# Patient Record
Sex: FEMALE | Race: WHITE | NOT HISPANIC OR LATINO | Employment: FULL TIME | URBAN - METROPOLITAN AREA
[De-identification: names, ages, dates, MRNs, and addresses within clinical notes are randomized per-mention and may not be internally consistent; named-entity substitution may affect disease eponyms.]

---

## 2020-01-02 ENCOUNTER — TRANSCRIBE ORDERS (OUTPATIENT)
Dept: ADMINISTRATIVE | Facility: HOSPITAL | Age: 40
End: 2020-01-02

## 2020-01-02 DIAGNOSIS — N63.23 UNSPECIFIED LUMP IN THE LEFT BREAST, LOWER OUTER QUADRANT: Primary | ICD-10-CM

## 2020-01-14 ENCOUNTER — APPOINTMENT (OUTPATIENT)
Dept: WOMENS IMAGING | Facility: HOSPITAL | Age: 40
End: 2020-01-14

## 2020-01-30 ENCOUNTER — HOSPITAL ENCOUNTER (OUTPATIENT)
Dept: ULTRASOUND IMAGING | Facility: HOSPITAL | Age: 40
Discharge: HOME | End: 2020-01-30

## 2020-01-30 ENCOUNTER — HOSPITAL ENCOUNTER (OUTPATIENT)
Dept: MAMMOGRAPHY | Facility: HOSPITAL | Age: 40
Discharge: HOME | End: 2020-01-30
Admitting: NURSE PRACTITIONER

## 2020-01-30 DIAGNOSIS — N63.23 UNSPECIFIED LUMP IN THE LEFT BREAST, LOWER OUTER QUADRANT: ICD-10-CM

## 2020-01-30 PROCEDURE — G0279 TOMOSYNTHESIS, MAMMO: HCPCS

## 2020-01-30 PROCEDURE — 77066 DX MAMMO INCL CAD BI: CPT | Performed by: RADIOLOGY

## 2020-01-30 PROCEDURE — 76642 ULTRASOUND BREAST LIMITED: CPT | Performed by: RADIOLOGY

## 2020-01-30 PROCEDURE — 77066 DX MAMMO INCL CAD BI: CPT

## 2020-01-30 PROCEDURE — 76642 ULTRASOUND BREAST LIMITED: CPT

## 2020-01-30 PROCEDURE — 77062 BREAST TOMOSYNTHESIS BI: CPT | Performed by: RADIOLOGY

## 2020-06-11 ENCOUNTER — APPOINTMENT (OUTPATIENT)
Dept: WOMENS IMAGING | Facility: HOSPITAL | Age: 40
End: 2020-06-11

## 2020-06-11 ENCOUNTER — OFFICE VISIT (OUTPATIENT)
Dept: OBSTETRICS AND GYNECOLOGY | Facility: HOSPITAL | Age: 40
End: 2020-06-11

## 2020-06-11 VITALS
SYSTOLIC BLOOD PRESSURE: 111 MMHG | RESPIRATION RATE: 20 BRPM | BODY MASS INDEX: 27.15 KG/M2 | WEIGHT: 173 LBS | HEIGHT: 67 IN | DIASTOLIC BLOOD PRESSURE: 70 MMHG | HEART RATE: 61 BPM

## 2020-06-11 DIAGNOSIS — Z86.69 HISTORY OF MIGRAINE: ICD-10-CM

## 2020-06-11 DIAGNOSIS — E07.9 DISEASE OF THYROID GLAND: Primary | ICD-10-CM

## 2020-06-11 DIAGNOSIS — M32.8 OTHER FORMS OF SYSTEMIC LUPUS ERYTHEMATOSUS, UNSPECIFIED ORGAN INVOLVEMENT STATUS (HCC): ICD-10-CM

## 2020-06-11 DIAGNOSIS — F41.9 ANXIETY: ICD-10-CM

## 2020-06-11 PROCEDURE — 99242 OFF/OP CONSLTJ NEW/EST SF 20: CPT | Performed by: OBSTETRICS & GYNECOLOGY

## 2020-06-11 RX ORDER — OMEPRAZOLE 20 MG/1
1 CAPSULE, DELAYED RELEASE ORAL NIGHTLY
COMMUNITY
Start: 2020-06-02

## 2020-06-11 RX ORDER — AZELAIC ACID 0.15 G/G
1 GEL TOPICAL TAKE AS DIRECTED
COMMUNITY
Start: 2020-05-12 | End: 2020-11-12

## 2020-06-11 RX ORDER — PROPRANOLOL HYDROCHLORIDE 120 MG/1
1 CAPSULE, EXTENDED RELEASE ORAL DAILY
COMMUNITY

## 2020-06-11 RX ORDER — BUSPIRONE HYDROCHLORIDE 10 MG/1
10 TABLET ORAL DAILY
COMMUNITY
Start: 2020-06-01

## 2020-06-11 RX ORDER — HYDROXYCHLOROQUINE SULFATE 200 MG/1
1 TABLET, FILM COATED ORAL 2 TIMES DAILY
COMMUNITY
Start: 2020-04-27

## 2020-06-11 RX ORDER — PREDNISONE 1 MG/1
1 TABLET ORAL DAILY
COMMUNITY
Start: 2020-06-09

## 2020-06-11 RX ORDER — CYCLOBENZAPRINE HCL 5 MG
1 TABLET ORAL AS NEEDED
COMMUNITY
Start: 2020-05-19 | End: 2020-11-12

## 2020-06-11 RX ORDER — CYCLOSPORINE 0.5 MG/ML
1 EMULSION OPHTHALMIC 2 TIMES DAILY
COMMUNITY
Start: 2020-03-27

## 2020-06-11 RX ORDER — ACETAMINOPHEN AND CODEINE PHOSPHATE 120; 12 MG/5ML; MG/5ML
1 SOLUTION ORAL DAILY
COMMUNITY

## 2020-06-11 RX ORDER — HYDROCODONE BITARTRATE AND ACETAMINOPHEN 10; 325 MG/1; MG/1
1 TABLET ORAL AS NEEDED
COMMUNITY
Start: 2020-05-29

## 2020-06-11 RX ORDER — LEVOMEFOLATE CALCIUM 15 MG
1 TABLET ORAL DAILY
COMMUNITY

## 2020-06-11 RX ORDER — CEVIMELINE HYDROCHLORIDE 30 MG/1
1 CAPSULE ORAL DAILY PRN
COMMUNITY
Start: 2020-06-09 | End: 2020-11-12

## 2020-06-11 RX ORDER — DOXYCYCLINE HYCLATE 100 MG/1
1 CAPSULE ORAL TAKE AS DIRECTED
COMMUNITY
Start: 2020-05-12 | End: 2020-11-12

## 2020-06-11 RX ORDER — SUMATRIPTAN 100 MG/1
1 TABLET, FILM COATED ORAL AS NEEDED
COMMUNITY
Start: 2020-05-12

## 2020-06-11 RX ORDER — ALPRAZOLAM 1 MG/1
1 TABLET ORAL AS NEEDED
COMMUNITY
Start: 2020-05-05

## 2020-06-11 RX ORDER — LEVOTHYROXINE SODIUM 50 MCG
1 TABLET ORAL DAILY
COMMUNITY
Start: 2020-06-07

## 2020-06-11 RX ORDER — DEXTROAMPHETAMINE SACCHARATE, AMPHETAMINE ASPARTATE, DEXTROAMPHETAMINE SULFATE AND AMPHETAMINE SULFATE 5; 5; 5; 5 MG/1; MG/1; MG/1; MG/1
1 TABLET ORAL DAILY PRN
COMMUNITY
Start: 2020-05-06 | End: 2020-11-12

## 2020-06-11 RX ORDER — HYDROXYZINE 50 MG/1
1 TABLET, FILM COATED ORAL 2 TIMES DAILY
COMMUNITY
Start: 2020-06-09 | End: 2022-06-30 | Stop reason: SDUPTHER

## 2020-06-11 RX ORDER — TIZANIDINE HYDROCHLORIDE 2 MG/1
1 CAPSULE, GELATIN COATED ORAL NIGHTLY
COMMUNITY
Start: 2020-06-02

## 2020-06-11 RX ORDER — ESCITALOPRAM OXALATE 20 MG/1
30 TABLET ORAL DAILY
COMMUNITY
Start: 2020-06-01

## 2020-06-12 PROBLEM — E07.9 DISEASE OF THYROID GLAND: Status: ACTIVE | Noted: 2020-01-01

## 2020-06-12 PROBLEM — F41.9 ANXIETY: Status: ACTIVE | Noted: 2020-06-12

## 2020-06-12 PROBLEM — Z86.69 HISTORY OF MIGRAINE: Status: ACTIVE | Noted: 2020-06-12

## 2020-06-12 NOTE — PROGRESS NOTES
2020        Mel Franks M.D.  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  As you will recall, she is a 40-year-old white female G0 with medical issues including lupus, hypothyroidism and Sjogren syndrome.  She is desirous of proceeding with attempts at conception but wishes to maximize her  outcome given her medical history and advanced maternal age.      On review of her social history, the patient is engaged to be .  Her fiancé has no children from any previous relationships.  She denies tobacco use and notes only occasional ethanol use.  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      Her family history is negative for diabetes, positive for hypertension in her father and negative for preeclampsia.  On further review of her family history, the patient denies any family members with Down syndrome, mental retardation or neural tube defects.  She further denies any family history of genetic disorders including cystic fibrosis, Duchenne muscular dystrophy or Johnson chorea.      On review of her past medical history she has hypothyroidism, Sjogren syndrome, systemic lupus erythematosus diagnosed in , anxiety and migraines.      On review of her surgical history, she had L5-S1 microdiscectomy in , wisdom teeth removal in  and a LEEP procedure.      Medication allergies include penicillin and sulfur.      Medications include Plaquenil/hydroxychloroquine 200 mg one tab p.o. b.i.d., prednisone 1 mg q day, propranolol 120 mg p.o. q day, Synthroid 50 mcg q day, hydroxyzine 50 mg 2 tabs b.i.d., Lexapro 30 mg q day, buspirone 10 mg q day, L-methylfolate 15 mg tablet q day, alprazolam 0.5 mg t.i.d., Imitrex 509 mg p.r.n., One-A-Day for Women vitamin, vitamin D, diclofenac 50 mg b.i.d.,  tizanidine 2 mg capsule, doxycycline 100 mg capsule and cevimeline 30 mg capsule    On exam today, /70 mmHg, heart rate 61 bpm, respirations 20 and weight 173 (BMI = 27.1).      The following is a summary of my counseling session with Dr. Luna concerning her desire for improved  outcome:    1. Advanced Maternal Age.  The patient is currently 40 years old.  I discussed the risk of fetal aneuploidy at age 40 years at the time of delivery including a risk for Down syndrome of 1 in 82 and a risk for any chromosomal abnormality as 1 in 53.  Options in genetic screening and genetic testing were discussed with the patient.  I noted genetic testing could include 1st trimester chorionic villous sampling.  This would carry a 1 in 100 procedure-related fetal loss rate.  I also noted the option of midtrimester transabdominal amniocentesis for the determination of fetal chromosomal complement as well as amniotic fluid alpha-fetoprotein.  I quoted a procedure-related fetal loss rate of 1 in 500 with a midtrimester amniocentesis.  I noted a recent option of cell-free fetal DNA for genetic screening.  This screen appears to have a high sensitivity for detection of Down syndrome but a lower detection for other fetal aneuploidies.  Options in genetic screening would include 1st trimester combined ultrasound and biochemical assessment.  I noted this would detect approximately 85% of fetuses with Down syndrome with a 5% false positive rate.  I would also note the option of midtrimester ultrasound and/or biochemical screening which could provide a detection rate for Down syndrome as high as 80% and for open neural tube defects above 90%.      2. Pregnancy in the Woman Age 40 and Older.  The rate of first births for women aged 40 to 44 remained essentially stable during the 1970s and early 1980s but increased more than fourfold from 1985 through 2012--from 0.5 to 2.3 per 1,000 women.  Women with advanced age at conception  are more likely to have a multifetal gestation because of the need for assisted reproduction and are more likely to require  delivery as a result of abnormal placentation, fetal malpresentation, an abnormal pattern of labor, or increased use of oxytocin in labor.  In addition, they are more likely to experience rupture of the sphincter, postpartum hemorrhage, and thromboembolism.      Older pregnant women have an incidence of gestational hypertension and preeclampsia 2 to 4 times as high as that of patients younger than 30 years.  The underlying risk for preeclampsia is further increased if coexisting medical disorders such as diabetes or chronic hypertension are present.  Moreover, the risk for preeclampsia increased to 10% to 20% in twin gestations and 25% to 60% in triplet gestations.  Xenia Hanna and colleagues reported that, if oocyte donation is used with IVF in women older than 43, the risk for preeclampsia triples.      The rates of both diabetes mellitus and gestational diabetes increase with advanced maternal age.  Data from the [employer] consortium included an adjusted odds ratio of 2.4 to gestational diabetes in women aged 40 or older, compared with a younger control group.  This increased risk may be a consequence of greater maternal habitus as well as declining insulin sensitivity.      Diabetes increases the risk of macrosomia,  birth, and gestational hypertension.  Among women with pregestational diabetes, the risks of congenital heart disease and fetal neural tube defects increase threefold.    Patients attempting pregnancy after age 40 have reduced fertility.  As such, the patient should consider ovulation predictor kits to increase her likelihood of success for conception.  If conception does not occur within six months of effort, I would suggest a basic fertility evaluation including hysterosalpingogram and semen analysis.      In a review of more than 5.4 million [place] pregnancies  without reported congenital anomalies, Jesus and colleagues found an association between advanced maternal age and stillbirth, with a higher risk of stillbirth at 37 to 41 weeks' gestation.  The effect of maternal age persisted despite adjusting for medical disease, parity and race/ethnicity.  Because the risk of fetal loss sharply increased at 40 weeks' gestation, in the study by Jesus and colleagues, women older than 40 should be considered for delivery by 40 weeks' gestation in the presence of good dating criteria.  (see Yovany and Diego, Optimal obstetrical care for women aged 40 and older, OBG Management, November 2014)    3. Folic Acid Supplementation.  I discussed the potential benefit of folic acid supplementation for the reduction of risk of fetal open neural tube defects.  I also noted more recent public health information would suggest folic acid supplementation is associated with a reduction in risk of congenital heart defects, abdominal wall defects and potentially cleft lip and palate.  I noted that this supplementation would need to be started at least three months preconceptually and continued through the 1st trimester of pregnancy.  The patient is currently utilizing L-methylfolate in conjunction with her Lexapro therapy and is encouraged to continue this supplementation for its potential fetal benefit.      4. Sjogren Syndrome.  Dryness of the exocrine glands, particularly the eyes and mouth, is referred to as sicca syndrome which is differentiated from Sjogren syndrome in that there is no evidence of an autoimmune disease.  When these symptoms are present as a component of an autoimmune disease, particularly another disease of connective tissue such as rheumatoid arthritis (most common), lupus, scleroderma or polymyositis, it is known as Sjogren syndrome.      Sjogren syndrome is characterized by lymphocytic and plasma cell infiltration and destruction of salivary and lacrimal glands with  subsequent diminished lacrimal and salivary gland secretions.  It can occur in a primary form where dry mouth and dry eyes develop as an isolated entity or a secondary form associated with other autoimmune connective tissue diseases.  The diagnosis of Sjogren syndrome is based on the presence of at least two of three objective diagnostic tests:  1) Positive serum levels of anti-Ro and/or anti-La or a positive rheumatoid factor and KATHY titer of at least 1:320, 2) salivary gland biopsy exhibiting focal sites of inflammation and, 3) keratoconjunctivitis sicca with ocular staining score of three or more.    Extraglandular involvement of Sjogren syndrome occurs in 50% of patients.  The systemic manifestations can include dyspareunia, pulmonary involvement including COPD, gastrointestinal conditions including hepatitis, renal manifestations including interstitial nephritis, neuropathy, arthralgias, hypothyroidism and rheumatoid arthritis.  Hematologic conditions such as lymphoma and leukemia have also been reported with nearly 5% of patients developing B-cell lymphoma.  Laboratory evaluation for patients should include evaluation of autoantibodies anti-SS-A and anti-SS-B as, if present, they are associated with congenital heart block.  Further, lab evaluation should include thyroid function tests, liver function tests, evaluation of aPLS antibodies and serum C3 and C4 levels.  Fetal surveillance should include ultrasound for growth and fetal cardiac evaluation for congenital heart block if anti-SS-A or anti-SS-B are present.      5. Preconceptual Genetic Carrier Screening.  The goal of genetic screening is to provide individuals with meaningful information that they can use to guide pregnancy planning based on their personal values.  Screening for any condition is optional and a patient may decline any or all carrier screening.  The determination of the appropriate screening approach should be based on the patient's  family history and personal values after counseling.  Per the March 2017 ACOG Committee Opinion, all patients who are considering pregnancy or are already pregnant, regardless of screening strategy and ethnicity, should be offered carrier screening for cystic fibrosis and spinal muscular atrophy, as well as a complete blood count and screening for thalassemias and hemoglobinopathies.  Fragile X permutation carrier screening is recommended for women with a family history of fragile X-related disorders or intellectual disability suggestive of fragile X syndrome (Committee Opinion #690). It is important to note, however, that carrier screening will not identify all individuals who are at risk of the screened conditions.      The patient and her fiance are both  and of  descent.  As such, each carries a 1 in 25 risk for being carriers of the autosomal recessive gene for cystic fibrosis.  I discussed the possibility of cystic fibrosis carrier status determination for Dr. 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  Should this return positive, I would offer similar testing to her fiance.  If both individuals of this couple were deemed positive as carriers of the autosomal recessive gene for cystic fibrosis, this would place their risk of having an offspring with cystic fibrosis disease as 1 in 4.   The patient and her fiancé will review this information on cystic fibrosis carrier status determination and proceed with screening, if desired.      Spinal muscular atrophy (SMA) is a genetic disorder that affects the control of muscle movement.  It is caused by a loss of specialized nerve cells, called motor neurons, in the spinal cord and brainstem.  The loss of motor neurons leads to weakness and wasting of muscles used for activities such as crawling, walking and controlling head movement.  In severe cases of spinal muscular atrophy, the muscles used for breathing and swallowing are affected.  SMA is the second most common fatal autosomal  recessive disorder after cystic fibrosis, with an estimated prevalence of 1 in 10,000 live births and a carrier frequency of 1/40 - 1/60.      Childhood SMA is subdivided into clinical groups on the basis of age of onset and clinical course.  Types I, II, III and IV spinal muscular atrophy are inherited in an autosomal recessive pattern, which means both copies of the SMN1 gene in each cell have mutations.  Because SMA is the result of a common single deletion event in 95% of the cases, the carrier test is very sensitive (~90% detection rate); however, the molecular testing does not identify all carriers and therefore false-negatives can occur.  Further, approximately 2% of affected individuals with SMA have a de jun mutation.      I discussed the possibility of SMA carrier status determination for Dr. Luna.  Should this return positive, I would offer similar testing to her fiance.  If both individuals of this couple were deemed positive as carriers of the autosomal recessive gene for SMA, this would place their risk of having an offspring with SMA disease as 1 in 4.   The patient and her fiancé will review this information on SMA carrier status determination and proceed with screening, if desired.      6. Systemic Lupus Erythematosus.  Systemic lupus erythematosus (SLE) is notoriously variable in its presentation, course and outcome.   Over the past several decades, pregnancy outcomes in women with SLE have remarkably improved.  Important factors for pregnancy outcome include:  whether disease is active at the beginning of the pregnancy, coexistence of other medical or obstetrical disorders, whether antiphospholipid antibodies are detected and absence of active renal involvement as manifest by proteinuria or renal dysfunction.  As a general rule during pregnancy, lupus improves in a third of women, remains unchanged in a third and worsens in the remaining third.      Management during pregnancy consists primarily  of monitoring maternal clinical and laboratory conditions as well as fetal well-being.  Pregnancy-induced thrombocytopenia and proteinuria resemble lupus disease activity and the identification of a lupus flare is confounded by the increase in facial and palmar erythema of normal pregnancy.  The sedimentation rate is misleading because of pregnancy-induced hyperfibrinogenemia.  Falling levels of complement components C3 and C4 are more likely to be associated with active lupus disease.      Pharmacologic treatments of lupus during pregnancy can include immunosuppressive agents such as azathioprine, antimalarials or glucocorticoid therapy.  Although hydroxychloroquine is noted to cross the placenta, it has not been associated with congenital malformations.  Further, in randomized studies of hydroxychloroquine versus placebo there has been a reported improvement in outcomes of those receiving hydroxychloroquine therapy. Immunosuppressive agents such as azathioprine are beneficial in controlling active disease and have a good safety record during pregnancy. Cyclophosphamide, however, is teratogenic and is not recommended for use during pregnancy.  For severe lupus flares, I would recommend bolus glucocorticoid steroids such as methylprednisolone.    The pediatrician should be informed of the patient's history such that evaluation for  lupus erythematosus can occur.  Specifically, these infants are at risk for complete or incomplete congenital heart block, subacute cutaneous lupus erythematosus lesions, hematologic manifestations including severe thrombocytopenia and rarely transient CNS abnormalities.      7. Hypothyroidism is Pregnancy.  Thyroid disease is the second most common endocrine disorder in pregnancy after diabetes.  It is estimated that 1 in 50 pregnancies has some mild subclinical hypothyroidism.  Hypothyroidism in pregnancy can be the result of prior thyroid surgery that interferes with thyroid  function, previous hyperthyroidism that was over treated with radioactive iodine or Hashimoto's thyroiditis.  Patients with hypothyroidism often will present with fatigue, lethargy, weight gain, cold intolerance and constipation.  Pregnant patients with hypothyroidism have a significant increase in their need for thyroid replacement in the 1st trimester.  An increased dose of 30% has been suggested which can be accomplished by increasing the daily dose or by having the patient take two extra doses of their medication per week.  Thyroid function tests can be followed every 4-8 weeks after the 1st trimester for further adjustment in therapy. Statistically, patients with hypothyroidism are at increased risk for the development of preeclampsia and assessment of blood pressure and proteinuria are warranted in the 3rd trimester.      8. Medication Review.    o Doxycycline (Monodox, Doxy-100, Oracea) is a highly effective and inexpensive broad-spectrum antibiotic with exceptional bioavailability; however, these benefits have been overshadowed by its classification alongside the tetracyclines - class D drugs, contraindicated in pregnancy. Tetracycline chelates calcium orthophosphate to form a complex that is irreversibly incorporated into teeth during the calcification stage of tooth development.  Despite the lack of data on permanent human dental staining or decreased fetal bone growth associated with doxycycline use during pregnancy, the lingering fear of irreversible tooth discoloration remains.  Doxycycline has a reduced ability to chelate calcium, and is less likely to cause permanent tooth discoloration than other drugs of its class.  It is therefore reasonable for doxycycline to be used in treatment of rickettsial illnesses, Lyme disease, skin infections and leptospirosis.  It is unclear, however, whether the risk and benefits warrant use in the treatment of acne in pregnancy.      o Prednisone (Deltasone) is in a  class of drugs known as corticosteroids.  This agent has been used for a variety of conditions in pregnancy including allergic disorders, ulcerative colitis, arthritis, lupus and in treatment of asthma.  Prednisone has not been formally assigned to a pregnancy category by the FDA, however, its active metabolite, prednisolone, has been assigned to pregnancy category C.  Some animal studies have revealed evidence of fetal harm although the data are conflicting.  There has been no association between prednisolone and human malformations of the cardiovascular system, spina bifida or limb reduction.  Some animal models have suggested increased risk for oral clefts but this has not been observed in human pregnancy.  Clearly corticosteroids can be associated with an increased risk for the development of diabetes and glucose intolerance.  As such, patients should be monitored closely for their diabetic status during pregnancy.  Further, patients with chronic prednisone use should receive stress dose steroids for operative procedures such as  delivery.  Patients on Prednisone therapy should also be co-treated with calcium and vitamin D in order to prevent the development of steroid-induced osteoporosis.  Regular weight bearing exercise should also be encouraged to minimize osteoporosis.      o Propranolol (Inderal) is a nonselective beta-adrenergic blocking agent which has been used safely to treat various indications in pregnancy including maternal hypertension, fetal tachycardia or arrhythmia, maternal hyperthyroidism, pheochromocytoma and maternal cardiac disease.  Beta-blockers may cause decreased placental perfusion, fetal and  bradycardia and hypoglycemia.  Other abnormalities that may be due to propranolol use include intrauterine growth retardation, small placentas, polycythemia, thrombocytopenia and hypocalcemia.  Although growth restriction is a serious concern, the benefits of maternal therapy with  beta-blockers, in some cases, may outweigh the risks to the fetus and must be judged on a case-by-case basis.      o Amphetamine/dextroamphetamine (Adderall) is in a group of medications assigned to pregnancy category C for use in pregnancy by the FDA.  Animal studies have revealed evidenced of embryotoxicity and teratogenicity; however, there are no well-controlled data in human pregnancy.  Infants exposed to Adderall in utero may experience symptoms of withdrawal including dysphoria, agitation, weakness and exhaustion.  Third trimester use should therefore be weaned to the lowest effective dose possible to minimize these withdrawal symptoms.      o Escitalopram (Lexapro) is an FDA category C drug for use in pregnancy.  Animal studies have revealed evidence of embryotoxicity with escitalopram.  There are no controlled data in human pregnancy.  Human spontaneous  has been reported with racemic citalopram.  Neonates exposed to SSRIs and SNRIs late in the third trimester have uncommonly reported clinical findings including respiratory distress, cyanosis, apnea, seizures, temperature instability, feeding difficulty, vomiting, hypoglycemia, hypotonia, hypertonia, hyperreflexia, tremor, jitteriness, irritability, and constant crying.  There effects have mostly occurred either at birth or within a few days of birth.  These features are consistent with either a direct toxic effect of SSRIs and SNRIs, or possibly a drug discontinuation syndrome; in some cases, the clinical picture is consistent with serotonin syndrome.  Epidemiological data have suggested that the use of SSRIs, particularly in late pregnancy, may increase the risk of persistent pulmonary hypertension in the . This drug should be used during pregnancy only if the benefit outweighs the risk to the fetus.  Newborns should be monitored if the maternal use of this drug continues into the later stages of pregnancy, particularly the third trimester.   Abrupt discontinuation should be avoided during pregnancy.       o Hydroxychloroquine (Plaquenil) possesses antimalarial actions and also exerts a beneficial effect in lupus erythematosus (chronic discoid or systemic) and acute or chronic rheumatoid arthritis. The precise mechanism of action is not known. In a systematic review of hydroxychloroquine use in pregnant patients with autoimmune diseases by Ary et al 2009, hydroxychloroquine was not associated with any increased risk of congenital defects, spontaneous abortions, fetal death, prematurity and decreased numbers of live births in patients with auto-immune diseases. Further, Parag PERLA et al (Lupus 2015) noted that among women taking hydroxychloroquine while pregnant with systemic lupus erythematosus (SLE),  birth (before 37 weeks) occurred in 15.8% compared with 44.2% for women not on the antimalarial agent (P=0.006) and the rate of intrauterine growth restriction was significantly lower in the hydroxychloroquine-treated group (10.5% vs. 28.6%, P=0.03).  In patients with SLE, hydroxychloroquine has been shown to improve maternal disease activity reducing the frequency of flares and thrombotic events and continuing the drug during pregnancy has been recommended.      o Alprazolam (Xanax) is a member of the benzodiazepine class of agents and is currently considered risk factor Dm for use in pregnancy.  Although no congenital anomalies have been attributed to the use of alprazolam during human pregnancies, other benzodiazepines have been suspected of producing fetal malformations after 1st trimester exposure.  Further,  withdrawal after in utero exposure to alprazolam throughout gestation has been reported.     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  She and her fiancé will review information on preconceptual genetic carrier screening for cystic fibrosis and SMA and proceed with evaluation, if  desired.  Given her diagnosis of lupus and Sjogren syndrome, I would recommend baseline evaluation of renal function with 24-hour urine studies as well as laboratory assessment of antiphospholipid antibody labs as well as anti-SS-A and SS-B, if not recently performed.  If the patient has autoantibodies to SS-A or SS-B, then fetal surveillance is indicated in the midtrimester for congenital heart block.  Given her age and its association with reduced fertility, I have encouraged her to utilize ovulation predictor kits if she makes a decision to proceed with attempts at conception to maximize her success.  Should she conceive, early ultrasound for dating is indicated as well as an empiric increased dose in Synthroid given the increased need for thyroid replacement in the 1st trimester.  If I can provide any further information concerning my counseling of Dr. Luna or be of further assistance in the future, please feel free to contact me.      Yours sincerely,        Keyshawn Pedroza M.D.  Director, Maternal-Fetal Medicine    cc:  Shayy Luna, TERI  [address] [place] [address]    Ruma Pacheco M.D.  [address] [place] [address]    [id number]    I spent a total time of 30 minutes with this patient of which greater than 50% was face-to-face counseling and coordination of care.  Questions asked by the patient were answered.

## 2020-11-12 ENCOUNTER — OFFICE VISIT (OUTPATIENT)
Dept: NEUROSURGERY | Facility: CLINIC | Age: 40
End: 2020-11-12

## 2020-11-12 VITALS — BODY MASS INDEX: 28.75 KG/M2 | RESPIRATION RATE: 15 BRPM | TEMPERATURE: 97 F | HEIGHT: 67 IN | WEIGHT: 183.2 LBS

## 2020-11-12 DIAGNOSIS — M51.36 DISCOGENIC LOW BACK PAIN: ICD-10-CM

## 2020-11-12 DIAGNOSIS — M54.16 LUMBAR RADICULOPATHY: Primary | ICD-10-CM

## 2020-11-12 DIAGNOSIS — Z98.890 HX OF LUMBAR DISCECTOMY: ICD-10-CM

## 2020-11-12 PROCEDURE — 99204 OFFICE O/P NEW MOD 45 MIN: CPT | Performed by: NEUROLOGICAL SURGERY

## 2020-11-12 RX ORDER — DOXYCYCLINE HYCLATE 100 MG/1
100 CAPSULE ORAL 2 TIMES DAILY
COMMUNITY

## 2020-11-12 NOTE — PROGRESS NOTES
NAME: JUAN JOSÉ BLOOD   DOS: 2020  : 1980  PCP: Ruma Pacheco MD    Chief Complaint:    Chief Complaint   Patient presents with   • Low back & right leg pain   • Hx of L5-S1 discectomy in [place] 2016       History of Present Illness:  40 y.o. 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  who presented with a history of prior back surgery in 2016 she reports a history of buttock hip and leg pain at the right buttock hip and leg she has a history of systemic lupus and is on chronic steroids she reported a relatively good relief of her right-sided hip and leg pain after surgery but a week later had intense inflammatory response of the leg gained almost 30 pounds being on chronic steroids and eventually the leg pain got better she has been plagued ever since with chronic back pain she denies any bowel bladder incontinence issues she has daily back pain is alleviated somewhat with rest it is associated with SI joint dysfunction she denies any other issues  PMHX  Allergies:  Allergies   Allergen Reactions   • Penicillins Hives   • Sulfa Antibiotics Hives     Medications    Current Outpatient Medications:   •  ALPRAZolam (XANAX) 1 MG tablet, Take 1 tablet by mouth As Needed., Disp: , Rfl:   •  busPIRone (BUSPAR) 10 MG tablet, Take 10 mg by mouth Daily., Disp: , Rfl:   •  diclofenac (VOLTAREN) 1 % gel gel, Apply 1 g topically to the appropriate area as directed Take As Directed., Disp: , Rfl:   •  escitalopram (LEXAPRO) 20 MG tablet, Take 30 mg by mouth Daily., Disp: , Rfl:   •  hydroxychloroquine (PLAQUENIL) 200 MG tablet, Take 1 tablet by mouth 2 (Two) Times a Day., Disp: , Rfl:   •  hydrOXYzine (ATARAX) 50 MG tablet, Take 1 tablet by mouth 2 (Two) Times a Day., Disp: , Rfl:   •  norethindrone (Norlyda) 0.35 MG tablet, Take 1 tablet by mouth Daily., Disp: , Rfl:   •  oxaprozin (DAYPRO) 600 MG tablet, Take 1,200 mg by mouth Daily., Disp: , Rfl:   •  predniSONE (DELTASONE) 1 MG  tablet, Take 1 tablet by mouth Daily., Disp: , Rfl:   •  SUMAtriptan (IMITREX) 100 MG tablet, Take 1 tablet by mouth As Needed., Disp: , Rfl:   •  SYNTHROID 50 MCG tablet, Take 1 tablet by mouth Daily., Disp: , Rfl:   •  TiZANidine (ZANAFLEX) 2 MG capsule, Take 1 capsule by mouth Every Night., Disp: , Rfl:   •  HYDROcodone-acetaminophen (NORCO)  MG per tablet, Take 1 tablet by mouth As Needed., Disp: , Rfl:   •  l-methylfolate 15 MG tablet tablet, Take 1 tablet by mouth Daily., Disp: , Rfl:   •  omeprazole (priLOSEC) 20 MG capsule, Take 1 capsule by mouth Every Night., Disp: , Rfl:   •  propranolol LA (INDERAL LA) 120 MG 24 hr capsule, Take 1 capsule by mouth Daily., Disp: , Rfl:   •  RESTASIS 0.05 % ophthalmic emulsion, Administer 1 drop to both eyes 2 (Two) Times a Day., Disp: , Rfl:   Past Medical History:  Past Medical History:   Diagnosis Date   • Anxiety    • Disease of thyroid gland 2020    hypothyroidism   • History of migraine    • Sjogren's syndrome (CMS/HCC)    • SLE (systemic lupus erythematosus) (CMS/HCC) 2004     Past Surgical History:  Past Surgical History:   Procedure Laterality Date   • DENTAL PROCEDURE     • LEEP     • LUMBAR DISCECTOMY Right 09/01/2016    L5-S1 Discectomy- Dr. Jacob Rosenstein with [facility]   • WISDOM TOOTH EXTRACTION       Social Hx:  Social History     Tobacco Use   • Smoking status: Never Smoker   • Smokeless tobacco: Never Used   Substance Use Topics   • Alcohol use: Yes     Comment: ocassionally   • Drug use: Never     Family Hx:  Family History   Problem Relation Age of Onset   • Breast cancer Neg Hx    • Ovarian cancer Neg Hx      Review of Systems:        Review of Systems   Constitutional: Positive for activity change and fatigue. Negative for appetite change, chills, diaphoresis, fever and unexpected weight change.   HENT: Negative for congestion, dental problem, drooling, ear discharge, ear pain, facial swelling, hearing loss, mouth  sores, nosebleeds, postnasal drip, rhinorrhea, sinus pressure, sneezing, sore throat, tinnitus, trouble swallowing and voice change.    Eyes: Negative for photophobia, pain, discharge, redness, itching and visual disturbance.   Respiratory: Negative for apnea, cough, choking, chest tightness, shortness of breath, wheezing and stridor.    Cardiovascular: Negative for chest pain, palpitations and leg swelling.   Gastrointestinal: Negative for abdominal distention, abdominal pain, anal bleeding, blood in stool, constipation, diarrhea, nausea, rectal pain and vomiting.   Endocrine: Negative for cold intolerance, heat intolerance, polydipsia, polyphagia and polyuria.   Genitourinary: Negative for decreased urine volume, difficulty urinating, dysuria, enuresis, flank pain, frequency, genital sores, hematuria and urgency.   Musculoskeletal: Positive for arthralgias, back pain, myalgias, neck pain and neck stiffness. Negative for gait problem and joint swelling.   Skin: Negative for color change, pallor, rash and wound.   Allergic/Immunologic: Positive for environmental allergies and immunocompromised state. Negative for food allergies.   Neurological: Positive for dizziness, numbness and headaches. Negative for tremors, seizures, syncope, facial asymmetry, speech difficulty, weakness and light-headedness.   Hematological: Negative for adenopathy. Does not bruise/bleed easily.   Psychiatric/Behavioral: Negative for agitation, behavioral problems, confusion, decreased concentration, dysphoric mood, hallucinations, self-injury, sleep disturbance and suicidal ideas. The patient is nervous/anxious. The patient is not hyperactive.    All other systems reviewed and are negative.     I have reviewed this note template and all pertinent parts of the review of systems social, family history, surgical history and medication list      Physical Examination:  Vitals:    11/12/20 0911   Resp: 15   Temp: 97 °F (36.1 °C)      General  Appearance:   Well developed, well nourished, well groomed, alert, and cooperative.  Neurological examination:  Neurologic Exam  Vital signs were reviewed and documented in the chart  Patient appeared in good neurologic function with normal comprehension fluent speech  Mood and affect are normal  Sense of smell deferred    Pupils symmetric equally reactive funduscopic exam not visualized   Visual fields intact to confrontation  Extraocular movements intact  Face motor function is symmetric  Facial sensations normal  Hearing intact to finger rub hearing intact to finger rub  Tongue is midline  Palate symmetric  Swallowing normal  Shoulder shrug normal    Muscle bulk and tone normal  5 out of 5 strength no motor drift  Gait normal intact  Negative Romberg  No clonus long tract signs or myelopathy    Reflexes symmetric  No edema noted and extremities skin appears normal    Straight leg raise sign absent  No signs of intrinsic hip dysfunction  Back is without any lesions or abnormality  Feet are warm and well perfused        Review of Imaging/DATA:  I reviewed her MRI it shows prior surgical with a right-sided disc bulge at 5 1 she has impressive Modic endplate changes  Diagnoses/Plan:    Ms. Luna is a 40 y.o. female   I spent 45 minutes face-to-face counseling with her she is very kind slightly anxious individual with a history of predominantly axial back pain with intermittent flareups related to her work I had a sonam discussion with her about the options but from a surgical standpoint I think given the degree of Modic endplate change etc. she would be a candidate for spinal fusion but I did explain that a lot of the long-term outcomes in terms of lumbar fusions are related to coping skills, anxiety, as much as they are pathology said that a surgery for axial back pain should be associated with about a 50% reduction in baseline scores 90% of the time however there is a chance of worsening    I talked about meditation  and anxiety management as it pertains to low back issues    I also told her to talk to her rheumatologist about cessation of the prednisone if the bone weakening agent and in a person with discogenic issues it may complicate surgeries down the road, additionally there is some evidence that TNF alpha inhibitors if they are an option for her systemic lupus may be a good option as they have been affiliated with potentially lowering back pain scores and sciatica though it is unproven    She is to call me if she needs to proceed with any further intervention not also be happy to have her see an interventional pain management doctor but I suspect that surgery would be more efficacious    I would favor a posterior approach given her prior back surgery as well has her unknown childbearing status

## 2021-09-30 ENCOUNTER — TRANSCRIBE ORDERS (OUTPATIENT)
Dept: ADMINISTRATIVE | Facility: HOSPITAL | Age: 41
End: 2021-09-30

## 2021-09-30 DIAGNOSIS — Z12.31 VISIT FOR SCREENING MAMMOGRAM: Primary | ICD-10-CM

## 2021-11-09 ENCOUNTER — APPOINTMENT (OUTPATIENT)
Dept: MAMMOGRAPHY | Facility: HOSPITAL | Age: 41
End: 2021-11-09

## 2021-12-08 ENCOUNTER — HOSPITAL ENCOUNTER (OUTPATIENT)
Dept: MAMMOGRAPHY | Facility: HOSPITAL | Age: 41
Discharge: HOME | End: 2021-12-08
Admitting: OBSTETRICS & GYNECOLOGY

## 2021-12-08 DIAGNOSIS — Z12.31 VISIT FOR SCREENING MAMMOGRAM: ICD-10-CM

## 2021-12-08 PROCEDURE — 77063 BREAST TOMOSYNTHESIS BI: CPT | Performed by: RADIOLOGY

## 2021-12-08 PROCEDURE — 77067 SCR MAMMO BI INCL CAD: CPT

## 2021-12-08 PROCEDURE — 77063 BREAST TOMOSYNTHESIS BI: CPT

## 2021-12-08 PROCEDURE — 77067 SCR MAMMO BI INCL CAD: CPT | Performed by: RADIOLOGY

## 2022-06-30 ENCOUNTER — OFFICE VISIT (OUTPATIENT)
Dept: NEUROLOGY | Facility: CLINIC | Age: 42
End: 2022-06-30

## 2022-06-30 VITALS
SYSTOLIC BLOOD PRESSURE: 124 MMHG | WEIGHT: 204.6 LBS | BODY MASS INDEX: 32.11 KG/M2 | DIASTOLIC BLOOD PRESSURE: 70 MMHG | HEIGHT: 67 IN | HEART RATE: 66 BPM | RESPIRATION RATE: 14 BRPM | OXYGEN SATURATION: 100 %

## 2022-06-30 DIAGNOSIS — G43.109 MIGRAINE WITH AURA AND WITHOUT STATUS MIGRAINOSUS, NOT INTRACTABLE: ICD-10-CM

## 2022-06-30 DIAGNOSIS — R41.89 BRAIN FOG: Primary | ICD-10-CM

## 2022-06-30 PROCEDURE — 99203 OFFICE O/P NEW LOW 30 MIN: CPT | Performed by: PSYCHIATRY & NEUROLOGY

## 2022-06-30 RX ORDER — DEXTROAMPHETAMINE SACCHARATE, AMPHETAMINE ASPARTATE, DEXTROAMPHETAMINE SULFATE AND AMPHETAMINE SULFATE 5; 5; 5; 5 MG/1; MG/1; MG/1; MG/1
TABLET ORAL
COMMUNITY
Start: 2022-06-22

## 2022-06-30 RX ORDER — METOPROLOL SUCCINATE 25 MG/1
25 TABLET, EXTENDED RELEASE ORAL DAILY
COMMUNITY
Start: 2022-06-11

## 2022-06-30 RX ORDER — AMLODIPINE BESYLATE 10 MG/1
10 TABLET ORAL DAILY
COMMUNITY
Start: 2022-06-11

## 2022-06-30 RX ORDER — CETIRIZINE HYDROCHLORIDE 10 MG/1
TABLET ORAL
COMMUNITY
Start: 2022-06-24

## 2022-06-30 RX ORDER — CEVIMELINE HYDROCHLORIDE 30 MG/1
30 CAPSULE ORAL 3 TIMES DAILY
COMMUNITY
Start: 2022-06-14

## 2022-06-30 RX ORDER — HYDROXYZINE 50 MG/1
1 TABLET, FILM COATED ORAL 4 TIMES DAILY
COMMUNITY
Start: 2022-04-19

## 2022-06-30 NOTE — PROGRESS NOTES
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  As per patient she had COVID in March of this year during which she had drops in oxygen (managed at home).  After that she had profound brain fog as well as vertigo that has improved significantly over time.  She did get cognitive therapy which also helped.  She states that she is still slightly dizzy occasionally and has mild brain fog now.  Her blood pressure that was normally low also started to remain extremely high post COVID and she was started on amlodipine and metoprolol in addition to the Inderal that she takes for her headaches.  With regards to her headaches she states she currently has about 4-5 headaches a month that are preceded by visual auras with nausea, photophobia and phonophobia.  She either takes Imitrex subcu or Imitrex p.o. which usually helps.  Is on Inderal  mg daily for headache prophylaxis.    The following portions of the patient's history were reviewed today and updated as of 06/30/2022  : allergies, current medications, past family history, past medical history, past social history, past surgical history and problem list  These document will be scanned to patient's chart.      Current Outpatient Medications:   •  ALPRAZolam (XANAX) 1 MG tablet, Take 1 tablet by mouth As Needed., Disp: , Rfl:   •  amLODIPine (NORVASC) 10 MG tablet, Take 10 mg by mouth Daily., Disp: , Rfl:   •  busPIRone (BUSPAR) 10 MG tablet, Take 10 mg by mouth Daily., Disp: , Rfl:   •  cetirizine (zyrTEC) 10 MG tablet, , Disp: , Rfl:   •  cevimeline (EVOXAC) 30 MG capsule, Take 30 mg by mouth 3 (Three) Times a Day., Disp: , Rfl:   •   diclofenac (VOLTAREN) 1 % gel gel, Apply 1 g topically to the appropriate area as directed Take As Directed., Disp: , Rfl:   •  doxycycline (VIBRAMYCIN) 100 MG capsule, Take 100 mg by mouth 2 (Two) Times a Day., Disp: , Rfl:   •  escitalopram (LEXAPRO) 20 MG tablet, Take 30 mg by mouth Daily., Disp: , Rfl:   •  HYDROcodone-acetaminophen (NORCO)  MG per tablet, Take 1 tablet by mouth As Needed., Disp: , Rfl:   •  hydroxychloroquine (PLAQUENIL) 200 MG tablet, Take 1 tablet by mouth 2 (Two) Times a Day., Disp: , Rfl:   •  hydrOXYzine (ATARAX) 50 MG tablet, Take 1 tablet by mouth 4 (Four) Times a Day., Disp: , Rfl:   •  l-methylfolate 15 MG tablet tablet, Take 1 tablet by mouth Daily., Disp: , Rfl:   •  Melatonin 3 MG capsule, one tab at bedtime, Disp: , Rfl:   •  metoprolol succinate XL (TOPROL-XL) 25 MG 24 hr tablet, Take 25 mg by mouth Daily., Disp: , Rfl:   •  norethindrone (MICRONOR) 0.35 MG tablet, Take 1 tablet by mouth Daily., Disp: , Rfl:   •  omeprazole (priLOSEC) 20 MG capsule, Take 1 capsule by mouth Every Night., Disp: , Rfl:   •  oxaprozin (DAYPRO) 600 MG tablet, Take 1,200 mg by mouth Daily., Disp: , Rfl:   •  predniSONE (DELTASONE) 1 MG tablet, Take 1 tablet by mouth Daily., Disp: , Rfl:   •  propranolol LA (INDERAL LA) 120 MG 24 hr capsule, Take 1 capsule by mouth Daily., Disp: , Rfl:   •  RESTASIS 0.05 % ophthalmic emulsion, Administer 1 drop to both eyes 2 (Two) Times a Day., Disp: , Rfl:   •  SUMAtriptan (IMITREX) 100 MG tablet, Take 1 tablet by mouth As Needed., Disp: , Rfl:   •  SYNTHROID 50 MCG tablet, Take 1 tablet by mouth Daily., Disp: , Rfl:   •  TiZANidine (ZANAFLEX) 2 MG capsule, Take 1 capsule by mouth Every Night., Disp: , Rfl:   •  amphetamine-dextroamphetamine (ADDERALL) 20 MG tablet, , Disp: , Rfl:    Past Medical History:   Diagnosis Date   • Anxiety    • COVID-19 03/2022   • Disease of thyroid gland 2020    hypothyroidism   • History of migraine    • Sjogren's syndrome (HCC)   "  • SLE (systemic lupus erythematosus) ([facility]) 2004      Past Surgical History:   Procedure Laterality Date   • DENTAL PROCEDURE     • LEEP     • LUMBAR DISCECTOMY Right 09/01/2016    L5-S1 Discectomy- Dr. Jacob Rosenstein with [facility]   • WISDOM TOOTH EXTRACTION        Family History   Problem Relation Age of Onset   • Breast cancer Neg Hx    • Ovarian cancer Neg Hx       Social History     Socioeconomic History   • Marital status: Single   Tobacco Use   • Smoking status: Never Smoker   • Smokeless tobacco: Never Used   Substance and Sexual Activity   • Alcohol use: Yes     Comment: ocassionally   • Drug use: Never   • Sexual activity: Defer     Birth control/protection: OCP     Review of Systems   Constitutional: Positive for fatigue.   Neurological: Positive for dizziness.   All other systems reviewed and are negative.      Objective:    /70   Pulse 66   Resp 14   Ht 170.2 cm (67\")   Wt 92.8 kg (204 lb 9.6 oz)   SpO2 100%   BMI 32.04 kg/m²     Neurology Exam:    General apperance: NAD.     Mental status: Alert, awake and oriented to time place and person.  Could tell me the month, year, her date of birth and full address    Recent and Remote memory: Intact.    Attention span and Concentration: Normal.     Language and Speech: Intact- No dysarthria.    Fluency, Naming , Repitition and Comprehension:  Intact    Cranial Nerves:   CN II: Visual fields are full. Intact. Fundi - Normal, No papillederma, Pupils - JANE  CN III, IV and VI: Extraocular movements are intact. Normal saccades.  Mild end gaze nystagmus on both sides  CN V: Facial sensation is intact.   CN VII: Muscles of facial expression reveal no asymmetry. Intact.   CN VIII: Hearing is intact. Whispered voice intact.   CN IX and X: Palate elevates symmetrically. Intact  CN XI: Shoulder shrug is intact.   CN XII: Tongue is midline without evidence of atrophy or fasciculation.     Ophthalmoscopic exam of optic disc- " deferred    Motor:  Right UE muscle strength 5/5. Normal tone.     Left UE muscle strength 5/5. Normal tone.      Right LE muscle strength5/5. Normal tone.     Left LE muscle strength 5/5. Normal tone.      Sensory: Normal light touch, vibration and pinprick sensation bilaterally.    DTRs: 3+ bilaterally in upper and 2+ bilaterally lower extremities.    Babinski: Negative bilaterally.    Co-ordination: Normal finger-to-nose, heel to shin B/L.    Rhomberg: Negative.    Gait: Normal.  Could do tandem walking    Cardiovascular: Regular rate and rhythm without murmur, gallop or rub.    Assessment and Plan:  1. Brain fog  Patient had brain fog post-COVID that is gradually improving.  Some of her medications including hydroxyzine could also contribute to it however she is wary of reducing the dose of hydroxyzine as it really helps with her itching related to lupus  She has finished cognitive therapy  I have told her to start doing physical and mental exercises  She should also start taking vitamin B12 supplements  Her blood pressure is now on the lower side of normal therefore I have told her to gradually go off of her metoprolol and to keep her self well-hydrated  Of note-there were some reports of Claritin helping with brain fog and she could take a course of that    2. Migraine with aura and without status migrainosus, not intractable  Currently on Inderal  mg daily and Imitrex as needed  I have told her to start taking magnesium oxide supplements    Return in about 6 months (around 12/30/2022).     I spent over 35 minutes with the patient face to face out of which over 50% (25 minutes) was spent in management, instructions and education.     Rasheeda Madera MD

## 2023-02-08 ENCOUNTER — TRANSCRIBE ORDERS (OUTPATIENT)
Dept: ADMINISTRATIVE | Facility: HOSPITAL | Age: 43
End: 2023-02-08
Payer: COMMERCIAL

## 2023-02-08 DIAGNOSIS — Z12.31 ENCOUNTER FOR SCREENING MAMMOGRAM FOR MALIGNANT NEOPLASM OF BREAST: Primary | ICD-10-CM

## 2023-03-04 ENCOUNTER — HOSPITAL ENCOUNTER (OUTPATIENT)
Dept: MAMMOGRAPHY | Facility: HOSPITAL | Age: 43
Discharge: HOME | End: 2023-03-04
Admitting: OBSTETRICS & GYNECOLOGY
Payer: COMMERCIAL

## 2023-03-04 DIAGNOSIS — Z12.31 ENCOUNTER FOR SCREENING MAMMOGRAM FOR MALIGNANT NEOPLASM OF BREAST: ICD-10-CM

## 2023-03-04 PROCEDURE — 77063 BREAST TOMOSYNTHESIS BI: CPT

## 2023-03-04 PROCEDURE — 77063 BREAST TOMOSYNTHESIS BI: CPT | Performed by: RADIOLOGY

## 2023-03-04 PROCEDURE — 77067 SCR MAMMO BI INCL CAD: CPT | Performed by: RADIOLOGY

## 2023-03-04 PROCEDURE — 77067 SCR MAMMO BI INCL CAD: CPT

## 2023-03-28 ENCOUNTER — HOSPITAL ENCOUNTER (OUTPATIENT)
Dept: MAMMOGRAPHY | Facility: HOSPITAL | Age: 43
Discharge: HOME | End: 2023-03-28
Admitting: RADIOLOGY
Payer: COMMERCIAL

## 2023-03-28 ENCOUNTER — TRANSCRIBE ORDERS (OUTPATIENT)
Dept: MAMMOGRAPHY | Facility: HOSPITAL | Age: 43
End: 2023-03-28
Payer: COMMERCIAL

## 2023-03-28 DIAGNOSIS — R92.8 ABNORMAL MAMMOGRAM: ICD-10-CM

## 2023-03-28 DIAGNOSIS — R92.8 ABNORMAL MAMMOGRAM: Primary | ICD-10-CM

## 2023-03-28 PROCEDURE — 77065 DX MAMMO INCL CAD UNI: CPT | Performed by: RADIOLOGY

## 2023-03-28 PROCEDURE — 77065 DX MAMMO INCL CAD UNI: CPT

## 2023-04-10 ENCOUNTER — HOSPITAL ENCOUNTER (OUTPATIENT)
Dept: MAMMOGRAPHY | Facility: HOSPITAL | Age: 43
Discharge: HOME | End: 2023-04-10
Payer: COMMERCIAL

## 2023-04-10 DIAGNOSIS — R92.8 ABNORMAL MAMMOGRAM: ICD-10-CM

## 2023-04-10 PROCEDURE — A4648 IMPLANTABLE TISSUE MARKER: HCPCS

## 2023-04-10 PROCEDURE — 19081 BX BREAST 1ST LESION STRTCTC: CPT | Performed by: RADIOLOGY

## 2023-04-10 PROCEDURE — 0 LIDOCAINE 1 % SOLUTION: Performed by: RADIOLOGY

## 2023-04-10 PROCEDURE — 77065 DX MAMMO INCL CAD UNI: CPT | Performed by: RADIOLOGY

## 2023-04-10 PROCEDURE — 76098 X-RAY EXAM SURGICAL SPECIMEN: CPT

## 2023-04-10 PROCEDURE — 88305 TISSUE EXAM BY PATHOLOGIST: CPT | Performed by: OBSTETRICS & GYNECOLOGY

## 2023-04-10 RX ORDER — LIDOCAINE HYDROCHLORIDE AND EPINEPHRINE 10; 10 MG/ML; UG/ML
10 INJECTION, SOLUTION INFILTRATION; PERINEURAL ONCE
Status: COMPLETED | OUTPATIENT
Start: 2023-04-10 | End: 2023-04-10

## 2023-04-10 RX ORDER — LIDOCAINE HYDROCHLORIDE 10 MG/ML
5 INJECTION, SOLUTION INFILTRATION; PERINEURAL ONCE
Status: COMPLETED | OUTPATIENT
Start: 2023-04-10 | End: 2023-04-10

## 2023-04-10 RX ADMIN — LIDOCAINE HYDROCHLORIDE,EPINEPHRINE BITARTRATE 10 ML: 10; .01 INJECTION, SOLUTION INFILTRATION; PERINEURAL at 09:20

## 2023-04-10 RX ADMIN — Medication 5 ML: at 09:10

## 2023-04-10 NOTE — PROGRESS NOTES
Alert and orientated. States having some discomfort, cold compress at biopsy site; encouraged continued cold compress as recommended. No active bleeding. Steri-strips not visualized, gauze dressing intact. Cold pack given. Verbalizes and demonstrates understanding of post-care instructions, written copy given.

## 2023-04-11 ENCOUNTER — TELEPHONE (OUTPATIENT)
Dept: MAMMOGRAPHY | Facility: HOSPITAL | Age: 43
End: 2023-04-11
Payer: COMMERCIAL

## 2023-04-11 LAB
CYTO UR: NORMAL
LAB AP CASE REPORT: NORMAL
LAB AP CLINICAL INFORMATION: NORMAL
PATH REPORT.FINAL DX SPEC: NORMAL
PATH REPORT.GROSS SPEC: NORMAL

## 2023-04-11 NOTE — TELEPHONE ENCOUNTER
Patient notified of pathology results and recommendation. Verbalizes understanding. States having some discomfort, using analgesics with some relief. Denies signs and symptoms of infection. Questions answered, support given, verbalized understanding.

## 2023-04-27 ENCOUNTER — TELEPHONE (OUTPATIENT)
Dept: MAMMOGRAPHY | Facility: HOSPITAL | Age: 43
End: 2023-04-27
Payer: COMMERCIAL

## 2023-04-27 NOTE — TELEPHONE ENCOUNTER
Returned call to Fabi at Dr. Pacheco's office.  Patient was seen at their office yesterday for continued pain after breast biopsy 4/10/23.  Fabi stated Dr. Pacheco examined the breast and no signs or symptoms of infection were noted.  Dr. Pacheco encouraged NSAIDS and also prescribed patient Gabapentin. Fabi asked if there is anything else [facility] would recommend.  Nurse encouraged firm supporting bra and cautious use of cold pack over area 3-4 times a day.  Fabi stated she would call the patient with this information.

## 2023-05-01 ENCOUNTER — TELEPHONE (OUTPATIENT)
Dept: MAMMOGRAPHY | Facility: HOSPITAL | Age: 43
End: 2023-05-01
Payer: COMMERCIAL

## 2023-05-01 NOTE — TELEPHONE ENCOUNTER
Contacted patient at Dr SHU Tipton's request. Patient states she is doing better. States she is quite physically active at work and was told that may contribute to the length of time discomfort continues. Continues to use cold compresses, supportive bra & medications recommended by PCP.

## 2024-07-16 ENCOUNTER — TELEPHONE (OUTPATIENT)
Age: 44
End: 2024-07-16
Payer: COMMERCIAL

## 2024-07-16 NOTE — TELEPHONE ENCOUNTER
This patient needs a 30 minute slot  She has not been seen in over 2 years.  It says it is an extended visit but is only in a 15 minute slot.  Please reschedule her to a 30 minute slot with either NP.  Thanks!

## 2024-08-26 ENCOUNTER — TELEPHONE (OUTPATIENT)
Age: 44
End: 2024-08-26
Payer: COMMERCIAL

## 2024-08-26 NOTE — TELEPHONE ENCOUNTER
TRIED CONTACTING PT REGARDING CANCELLED APPT DUE TO [facility] BEING OUT SICK, ABLE TO OFFER VIRTUAL VISIT IN SAME TIME SLOT. TIMES HAVE BEEN BLOCKED OFF, PLEASE FORWARD TO [place].     -BJ

## 2024-09-05 ENCOUNTER — TELEPHONE (OUTPATIENT)
Age: 44
End: 2024-09-05
Payer: COMMERCIAL

## 2024-09-05 NOTE — TELEPHONE ENCOUNTER
PT HASN'T BEEN SEEN SINCE 2022, LAST TWO APPTS MADE BY ANOTHER DOCS OFFICE AND WERE CANCELLED.     CALLED TO SEE IF SHE WANTED TO RESCHEDULE.    -BJ

## 2024-10-04 ENCOUNTER — HOSPITAL ENCOUNTER (OUTPATIENT)
Dept: MAMMOGRAPHY | Facility: HOSPITAL | Age: 44
Discharge: HOME | End: 2024-10-04
Admitting: OBSTETRICS & GYNECOLOGY
Payer: COMMERCIAL

## 2024-10-04 DIAGNOSIS — Z12.31 VISIT FOR SCREENING MAMMOGRAM: ICD-10-CM

## 2024-10-04 PROCEDURE — 77067 SCR MAMMO BI INCL CAD: CPT

## 2024-10-04 PROCEDURE — 77063 BREAST TOMOSYNTHESIS BI: CPT

## 2024-10-29 ENCOUNTER — TELEPHONE (OUTPATIENT)
Age: 44
End: 2024-10-29
Payer: COMMERCIAL

## 2024-10-29 NOTE — TELEPHONE ENCOUNTER
Debora from [facility] Seng Pacheco, patient's PCP's office, called stating that patient was seen in our office last week and they are requesting office note. I advised that patient canceled appointment on 10/24 and has not rescheduled. She states she will reach out to patient.